# Patient Record
Sex: FEMALE | Race: ASIAN
[De-identification: names, ages, dates, MRNs, and addresses within clinical notes are randomized per-mention and may not be internally consistent; named-entity substitution may affect disease eponyms.]

---

## 2021-09-07 PROBLEM — Z00.00 ENCOUNTER FOR PREVENTIVE HEALTH EXAMINATION: Noted: 2021-09-07

## 2021-09-09 ENCOUNTER — APPOINTMENT (OUTPATIENT)
Dept: THORACIC SURGERY | Facility: CLINIC | Age: 71
End: 2021-09-09
Payer: MEDICARE

## 2021-09-09 VITALS
OXYGEN SATURATION: 97 % | DIASTOLIC BLOOD PRESSURE: 76 MMHG | SYSTOLIC BLOOD PRESSURE: 127 MMHG | HEIGHT: 61 IN | TEMPERATURE: 98.2 F | WEIGHT: 77 LBS | BODY MASS INDEX: 14.54 KG/M2 | RESPIRATION RATE: 16 BRPM | HEART RATE: 73 BPM

## 2021-09-09 DIAGNOSIS — Z87.898 PERSONAL HISTORY OF OTHER SPECIFIED CONDITIONS: ICD-10-CM

## 2021-09-09 DIAGNOSIS — Z87.19 PERSONAL HISTORY OF OTHER DISEASES OF THE DIGESTIVE SYSTEM: ICD-10-CM

## 2021-09-09 DIAGNOSIS — Z77.22 CONTACT WITH AND (SUSPECTED) EXPOSURE TO ENVIRONMENTAL TOBACCO SMOKE (ACUTE) (CHRONIC): ICD-10-CM

## 2021-09-09 DIAGNOSIS — Z86.39 PERSONAL HISTORY OF OTHER ENDOCRINE, NUTRITIONAL AND METABOLIC DISEASE: ICD-10-CM

## 2021-09-09 DIAGNOSIS — Z86.69 PERSONAL HISTORY OF OTHER DISEASES OF THE NERVOUS SYSTEM AND SENSE ORGANS: ICD-10-CM

## 2021-09-09 DIAGNOSIS — Z86.79 PERSONAL HISTORY OF OTHER DISEASES OF THE CIRCULATORY SYSTEM: ICD-10-CM

## 2021-09-09 PROCEDURE — 99205 OFFICE O/P NEW HI 60 MIN: CPT

## 2021-09-10 PROBLEM — Z86.39 HISTORY OF HYPERLIPIDEMIA: Status: RESOLVED | Noted: 2021-09-10 | Resolved: 2021-09-10

## 2021-09-10 PROBLEM — Z87.19 HISTORY OF SMALL BOWEL OBSTRUCTION: Status: RESOLVED | Noted: 2021-09-10 | Resolved: 2021-09-10

## 2021-09-10 PROBLEM — Z77.22 HISTORY OF SECOND HAND SMOKE EXPOSURE: Status: ACTIVE | Noted: 2021-09-10

## 2021-09-10 PROBLEM — Z86.79 HISTORY OF HYPERTENSION: Status: RESOLVED | Noted: 2021-09-10 | Resolved: 2021-09-10

## 2021-09-10 PROBLEM — Z87.898 HISTORY OF THYMOMA: Status: RESOLVED | Noted: 2021-09-10 | Resolved: 2021-09-10

## 2021-09-10 PROBLEM — Z86.69 HISTORY OF MYASTHENIA GRAVIS: Status: RESOLVED | Noted: 2021-09-10 | Resolved: 2021-09-10

## 2021-09-10 RX ORDER — CHLORHEXIDINE GLUCONATE 4 %
1000 LIQUID (ML) TOPICAL
Refills: 0 | Status: ACTIVE | COMMUNITY

## 2021-09-10 RX ORDER — FOLIC ACID 1 MG/1
1 TABLET ORAL
Refills: 0 | Status: ACTIVE | COMMUNITY

## 2021-09-10 RX ORDER — PYRIDOSTIGMINE BROMIDE 60 MG/1
60 TABLET ORAL
Refills: 0 | Status: ACTIVE | COMMUNITY

## 2021-09-10 RX ORDER — RALOXIFENE HYDROCHLORIDE 60 MG/1
60 TABLET, FILM COATED ORAL
Refills: 0 | Status: ACTIVE | COMMUNITY

## 2021-09-10 RX ORDER — ESOMEPRAZOLE MAGNESIUM 40 MG/1
40 CAPSULE, DELAYED RELEASE ORAL
Refills: 0 | Status: ACTIVE | COMMUNITY

## 2021-09-10 RX ORDER — LORATADINE 10 MG/1
10 TABLET ORAL
Refills: 0 | Status: ACTIVE | COMMUNITY

## 2021-09-10 RX ORDER — MULTIVITAMIN WITH FOLIC ACID 400 MCG
TABLET ORAL
Refills: 0 | Status: ACTIVE | COMMUNITY

## 2021-09-10 RX ORDER — ZOLPIDEM TARTRATE 5 MG/1
5 TABLET ORAL
Refills: 0 | Status: ACTIVE | COMMUNITY

## 2021-09-10 RX ORDER — VIT C/VIT E/LUTEIN/MINERALS 1 60-30-6
CAPSULE ORAL
Refills: 0 | Status: ACTIVE | COMMUNITY

## 2021-09-10 RX ORDER — MONTELUKAST 10 MG/1
10 TABLET, FILM COATED ORAL
Refills: 0 | Status: ACTIVE | COMMUNITY

## 2021-09-10 RX ORDER — METOPROLOL SUCCINATE 25 MG/1
25 TABLET, EXTENDED RELEASE ORAL
Refills: 0 | Status: ACTIVE | COMMUNITY

## 2021-09-10 RX ORDER — VITAMIN B COMPLEX
CAPSULE ORAL
Refills: 0 | Status: ACTIVE | COMMUNITY

## 2021-09-23 NOTE — CONSULT LETTER
[Dear  ___] : Dear  [unfilled], [Consult Letter:] : I had the pleasure of evaluating your patient, [unfilled]. [( Thank you for referring [unfilled] for consultation for _____ )] : Thank you for referring [unfilled] for consultation for [unfilled] [Please see my note below.] : Please see my note below. [Consult Closing:] : Thank you very much for allowing me to participate in the care of this patient.  If you have any questions, please do not hesitate to contact me. [Sincerely,] : Sincerely, [DrDinesh  ___] : Dr. MCPHERSON [FreeTextEntry3] : Murray Pyle MD, MPH \par System Director of Thoracic Surgery \par Director of Comprehensive Lung and Foregut [place] \par Professor Cardiovascular & Thoracic Surgery  \par [employer] at [facility]\par  [FreeTextEntry2] : Ronak Mackay MD (PCP/referring)\par Dr. Jameson Montalvo (Cardiologist)

## 2021-09-23 NOTE — HISTORY OF PRESENT ILLNESS
[FreeTextEntry1] : Ms. 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 Richard Bruce at [facility]/ then s/p XRT, followed by PCP Dr. Mackay with yearly CXR.\par \par CT Chest on 6/28/21:\par - small calcified calcified mediastinal and hilar LNs\par - large calcified granuloma in DEBI\par - new 1.1 cm slightly spiculated LLL solid nodule\par - additional new subpleural LLL solid nodule 9 mm \par - focal tree-in-bud nodularity seen in RLL, c/w small airways bronchiolitis\par \par PET/CT on 9/8/21:\par - 1.7cm SUV=15.5 solid RUL nodule (image 57)\par - the 1cm LLL nodule has improved, now 5mm (image 54), more likely inflammatory\par - a cluster of tiny nodules on image 57, decreased in conspicuity, with SUVmax 2.6\par - a linear active nodule in the RUL with SUV max 4.4 (image 65)\par - a subpleural 4mm DEBI nodule w/ SUV max 2 (image 49)\par \par Pt presents today for CT Sx consultation, referred by Dr. Mackay. \par

## 2021-09-23 NOTE — ASSESSMENT
[FreeTextEntry1] : Ms. 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 Richard Bruce at [facility] then s/p XRT, followed by PCP Dr. Mackay with yearly CXR.\par \par CT Chest on 6/28/21:\par - small calcified calcified mediastinal and hilar LNs\par - large calcified granuloma in DEBI\par - new 1.1 cm slightly spiculated LLL solid nodule\par - additional new subpleural LLL solid nodule 9 mm \par - focal tree-in-bud nodularity seen in RLL, c/w small airways bronchiolitis\par \par PET/CT on 9/8/21:\par - 1.7cm SUV=15.5 solid RUL nodule (image 57)\par - the 1cm LLL nodule has improved, now 5mm (image 54), more likely inflammatory\par - a cluster of tiny nodules on image 57, decreased in conspicuity, with SUVmax 2.6\par - a linear active nodule in the RUL with SUV max 4.4 (image 65)\par - a subpleural 4mm DEBI nodule w/ SUV max 2 (image 49)\par \par I have reviewed the patient's medical records and diagnostic images at time of this office consultation and have made the following recommendation:\par 1. CT and PET scans reviewed, I recommended a FNA of RUL nodule at [facility]\par 2. PFTs by Dr. Zak West\par 3. Obtain Op Note and Path from [facility] from 2006\par 4. RTC after above.\par \par \par I personally performed the services described in the documentation, reviewed the documentation recorded by the scribe in my presence and it accurately and completely records my words and actions.\par \par I, Nathan Campos NP, am scribing for and the presence of TARYN Adair, the following sections HISTORY OF PRESENT ILLNESS, PAST MEDICAL/FAMILY/SOCIAL HISTORY; REVIEW OF SYSTEMS; VITAL SIGNS; PHYSICAL EXAM; DISPOSITION.\par \par \par

## 2021-10-07 ENCOUNTER — APPOINTMENT (OUTPATIENT)
Dept: THORACIC SURGERY | Facility: CLINIC | Age: 71
End: 2021-10-07
Payer: MEDICARE

## 2021-10-07 VITALS
BODY MASS INDEX: 14.55 KG/M2 | WEIGHT: 77 LBS | DIASTOLIC BLOOD PRESSURE: 75 MMHG | TEMPERATURE: 98.4 F | OXYGEN SATURATION: 96 % | RESPIRATION RATE: 18 BRPM | HEART RATE: 75 BPM | SYSTOLIC BLOOD PRESSURE: 120 MMHG

## 2021-10-07 PROCEDURE — 99215 OFFICE O/P EST HI 40 MIN: CPT

## 2021-10-08 NOTE — DATA REVIEWED
[FreeTextEntry1] : I have independently reviewed the following:\par Path of FNA of RUL nodule on 9/23/21 at [facility]

## 2021-10-08 NOTE — ASSESSMENT
[FreeTextEntry1] : Ms. 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 Richard Bruce at [facility] then s/p XRT.\par \par FNA of RUL nodule on 9/23/21 at [facility]. Path showed granulomatous inflammation; negative malignancy. AFB and GMS stains pending.\par \par I have reviewed the patient's medical records and diagnostic images at time of this office consultation and have made the following recommendation:\par 1. Awaiting for cultures, will fax to Dr. West\par 2. Path reviewed and explained to patient, inflammatory, I recommended patient to F/U with Dr. West, RTC in 3mo with CT Chest w/o contrast.\par \par \par I personally performed the services described in the documentation, reviewed the documentation recorded by the scribe in my presence and it accurately and completely records my words and actions.\par \par I, Nathan Campos, ARLETTE, am scribing for and the presence of TARYN Adair, the following sections HISTORY OF PRESENT ILLNESS, PAST MEDICAL/FAMILY/SOCIAL HISTORY; REVIEW OF SYSTEMS; VITAL SIGNS; PHYSICAL EXAM; DISPOSITION.\par \par

## 2021-10-08 NOTE — HISTORY OF PRESENT ILLNESS
[FreeTextEntry1] : Ms. 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 Rcihard Bruce at [facility] then s/p XRT, followed by PCP Dr. Macaky with yearly CXR.\par \par CT Chest on 6/28/21:\par - small calcified calcified mediastinal and hilar LNs\par - large calcified granuloma in DEBI\par - new 1.1 cm slightly spiculated LLL solid nodule\par - additional new subpleural LLL solid nodule 9 mm \par - focal tree-in-bud nodularity seen in RLL, c/w small airways bronchiolitis\par \par PET/CT on 9/8/21:\par - 1.7cm SUV=15.5 solid RUL nodule (image 57)\par - the 1cm LLL nodule has improved, now 5mm (image 54), more likely inflammatory\par - a cluster of tiny nodules on image 57, decreased in conspicuity, with SUVmax 2.6\par - a linear active nodule in the RUL with SUV max 4.4 (image 65)\par - a subpleural 4mm DEBI nodule w/ SUV max 2 (image 49)\par \par FNA of RUL nodule on 9/23/21 at [facility]. Path showed granulomatous inflammation; negative malignancy. AFB and GMS stains pending.\par \par Patient is here today for a follow up.\par

## 2021-10-08 NOTE — CONSULT LETTER
[Dear  ___] : Dear  [unfilled], [Consult Letter:] : I had the pleasure of evaluating your patient, [unfilled]. [( Thank you for referring [unfilled] for consultation for _____ )] : Thank you for referring [unfilled] for consultation for [unfilled] [Please see my note below.] : Please see my note below. [Consult Closing:] : Thank you very much for allowing me to participate in the care of this patient.  If you have any questions, please do not hesitate to contact me. [Sincerely,] : Sincerely, [DrDinesh  ___] : Dr. MCPHERSON [FreeTextEntry2] : Ronak Mackay MD (PCP/referring)\par Dr. Jameson Montalvo (Cardiologist)  [FreeTextEntry3] : Murray Pyle MD, MPH \par System Director of Thoracic Surgery \par Director of Comprehensive Lung and Foregut [place] \par Professor Cardiovascular & Thoracic Surgery  \par [facility] [employer] at [facility]\par \par [facility]\par [address]\par Oncology [address]\[place] [address]\par Tel: [phone]\par Fax: [phone]\par

## 2022-01-13 ENCOUNTER — APPOINTMENT (OUTPATIENT)
Dept: THORACIC SURGERY | Facility: CLINIC | Age: 72
End: 2022-01-13
Payer: MEDICARE

## 2022-01-13 DIAGNOSIS — R91.8 OTHER NONSPECIFIC ABNORMAL FINDING OF LUNG FIELD: ICD-10-CM

## 2022-01-13 PROCEDURE — 99443: CPT | Mod: 95

## 2022-01-14 PROBLEM — R91.8 LUNG NODULES: Status: ACTIVE | Noted: 2021-09-08

## 2022-01-14 NOTE — HISTORY OF PRESENT ILLNESS
[Home] : at home, [unfilled] , at the time of the visit. [Medical Office: ([facility])___] : at the medical office located in  [Verbal consent obtained from patient] : the patient, [unfilled] [FreeTextEntry1] : Ms. MACI SUMNER, 71 year old female, never smoker, w/ hx of HTN, HLD (diet control), Myasthenia Gravis and Thymoma s/p sternotomy thymectomy in 2006 by Dr. Richard Bruce at [facility] then s/p XRT, followed by PCP Dr. Mackay with yearly CXR.\par \par CT Chest on 6/28/21:\par - small calcified calcified mediastinal and hilar LNs\par - large calcified granuloma in DEBI\par - new 1.1 cm slightly spiculated LLL solid nodule\par - additional new subpleural LLL solid nodule 9 mm \par - focal tree-in-bud nodularity seen in RLL, c/w small airways bronchiolitis\par \par PET/CT on 9/8/21:\par - 1.7cm SUV=15.5 solid RUL nodule (image 57)\par - the 1cm LLL nodule has improved, now 5mm (image 54), more likely inflammatory\par - a cluster of tiny nodules on image 57, decreased in conspicuity, with SUVmax 2.6\par - a linear active nodule in the RUL with SUV max 4.4 (image 65)\par - a subpleural 4mm DEBI nodule w/ SUV max 2 (image 49)\par \par FNA of RUL nodule on 9/23/21 at [facility]. Path showed granulomatous inflammation; negative malignancy. AFB and GMS stains pending.\par \par CT Chest on 01/10/2022:\par - stable RUL nodule now measuring 17 mm\par - interval decrease in size of previously noted LLL including a 7 mm nodule which previously measured 10 mm with evidence of central cavitation (4: 91) and a 5 mm LLL nodule (4: 101) previously 10 mm\par - stable tree-in-bud in DEBI up to 4 mm (4: 78)\par - stable areas of paramediastinal fibrosis and bronchiectasis. \par \par Patient is followed today via Telephonic visit.

## 2022-01-14 NOTE — CONSULT LETTER
[FreeTextEntry2] : Ronak Mackay MD (PCP/referring)\par Dr. Jameson Montalvo (Cardiologist)  [FreeTextEntry3] : Murray Pyle MD, MPH \par System Director of Thoracic Surgery \par Director of Comprehensive Lung and Foregut [place] \par Professor Cardiovascular & Thoracic Surgery  \par [facility] [employer] [facility]\par \par [facility]\par [address]\par Oncology [address]\[place] [address]\par Tel: [phone]\par Fax: [phone]\par

## 2022-01-14 NOTE — ASSESSMENT
[FreeTextEntry1] : \par Ms. MACI SUMNER, 71 year old female, never smoker, w/ hx of HTN, HLD (diet control), Myasthenia Gravis and Thymoma s/p sternotomy thymectomy in 2006 by Dr. Richard Bruce at [facility] then s/p XRT, followed by PCP Dr. Mackay with yearly CXR.\par \par CT Chest on 6/28/21:\par - small calcified calcified mediastinal and hilar LNs\par - large calcified granuloma in DEBI\par - new 1.1 cm slightly spiculated LLL solid nodule\par - additional new subpleural LLL solid nodule 9 mm \par - focal tree-in-bud nodularity seen in RLL, c/w small airways bronchiolitis\par \par PET/CT on 9/8/21:\par - 1.7cm SUV=15.5 solid RUL nodule (image 57)\par - the 1cm LLL nodule has improved, now 5mm (image 54), more likely inflammatory\par - a cluster of tiny nodules on image 57, decreased in conspicuity, with SUVmax 2.6\par - a linear active nodule in the RUL with SUV max 4.4 (image 65)\par - a subpleural 4mm DEBI nodule w/ SUV max 2 (image 49)\par \par FNA of RUL nodule on 9/23/21 at [facility]. Path showed granulomatous inflammation; negative malignancy. AFB and GMS stains pending.\par \par CT Chest on 01/10/2022:\par - stable RUL nodule now measuring 17 mm\par - interval decrease in size of previously noted LLL including a 7 mm nodule which previously measured 10 mm with evidence of central cavitation (4: 91) and a 5 mm LLL nodule (4: 101) previously 10 mm\par - stable tree-in-bud in DEBI up to 4 mm (4: 78)\par - stable areas of paramediastinal fibrosis and bronchiectasis. \par \par I have independently reviewed patient's CT on 01/10/2022 and have indicated my interpretations below:\par - CT chest reviewed and explained to patient, I recommended patient to return to office in 6 months with CT Chest without contrast.\par - F/u with Dr. West\par \par I have spent 25 minutes on the phone discussing above result and plan of care with patient.\par \par I personally performed the services described in the documentation, reviewed the documentation recorded by the scribe in my presence and it accurately and completely records my words and actions.\par \par Hue MYERS NP, am scribing for and the presence of TARYN Adair, the following sections HISTORY OF PRESENT ILLNESS, PAST MEDICAL/FAMILY/SOCIAL HISTORY; REVIEW OF SYSTEMS; VITAL SIGNS; PHYSICAL EXAM; DISPOSITION.

## 2022-08-01 ENCOUNTER — NON-APPOINTMENT (OUTPATIENT)
Age: 72
End: 2022-08-01